# Patient Record
Sex: MALE | Race: WHITE | NOT HISPANIC OR LATINO | URBAN - METROPOLITAN AREA
[De-identification: names, ages, dates, MRNs, and addresses within clinical notes are randomized per-mention and may not be internally consistent; named-entity substitution may affect disease eponyms.]

---

## 2025-04-17 ENCOUNTER — OFFICE VISIT (OUTPATIENT)
Dept: SURGERY | Facility: CLINIC | Age: 70
End: 2025-04-17
Payer: MEDICARE

## 2025-04-17 DIAGNOSIS — K40.90 LEFT INGUINAL HERNIA: Primary | ICD-10-CM

## 2025-04-17 PROCEDURE — 1036F TOBACCO NON-USER: CPT | Performed by: SURGERY

## 2025-04-17 PROCEDURE — 99203 OFFICE O/P NEW LOW 30 MIN: CPT | Performed by: SURGERY

## 2025-04-17 PROCEDURE — 1160F RVW MEDS BY RX/DR IN RCRD: CPT | Performed by: SURGERY

## 2025-04-17 PROCEDURE — 1159F MED LIST DOCD IN RCRD: CPT | Performed by: SURGERY

## 2025-04-17 RX ORDER — MULTIVIT/IRON SULF/FOLIC ACID 15MG-0.4MG
1 TABLET ORAL
COMMUNITY
Start: 2024-06-02

## 2025-04-17 RX ORDER — LANOLIN ALCOHOL/MO/W.PET/CERES
1 CREAM (GRAM) TOPICAL
COMMUNITY
Start: 2024-08-13

## 2025-04-17 NOTE — PROGRESS NOTES
Subjective   Patient ID: Timi aNm is a 69 y.o. male who presents for Hernia (LIH, C/O BURNING PAIN).  Palpable reducible lump in the left inguinal area, the patient noticed above-mentioned changes few months ago.    HPI as described above.  Patient had a recent admission to the hospital with a diverticulitis with intramural abscess  Review of Systems GI consistent with reducible uncomfortable lump in the left inguinal area.  Review of all other 10 system is negative  Physical Exam Pupils equal bilaterally, oral mucosa moist, bilateral breath sounds, clear to auscultation, regular rhythm and rate, no murmurs, abdomen is soft, nontender, nondistended, palpable reducible left inguinal hernia.  No evidence of recurrence of the right inguinal hernia, palpable peripheral pulse, no focal neurological motor deficits.  ENT exam within normal limits.  Musculoskeletal exam within normal limits.      Objective I reviewed all available data including lab results, radiological studies, previous reports and notes.  CT scan of the abdomen pelvis official report showed no evidence of the left inguinal hernia.  I personally reviewed the result.  There is evidence of small hernia, consistent with the findings on the physical exam    No diagnosis found.   Problem List[1]   RX Allergies[2]   Medication Documentation Review Audit       Reviewed by Hector Landry MD (Physician) on 04/17/25 at 1542      Medication Order Taking? Sig Documenting Provider Last Dose Status   Tab-A-Dada Multivitamin w-iron 15 mg iron- 400 mcg tablet [id number] Yes Take 1 tablet by mouth early in the morning.. Historical Provider, MD  Active   thiamine 100 mg tablet [id number] Yes Take 1 tablet (100 mg) by mouth early in the morning.. Historical Provider, MD  Active   turmeric-ging-olive-oreg-capry 100 mg-150 mg- 50 mg-150 mg capsule [id number] Yes 2 caps, ORAL, DAILY, Date: 8/10/24 12:32:00 PM EDT, Capsule Historical Provider, MD  Active                     Medical History[3]  Tobacco Use History[4]  Family History[5]   Surgical History[6]    Assessment/Plan   The patient with symptomatic left inguinal hernia.  The patient has indication for open symptomatic left inguinal hernia repair.  Patient had a previous history of diverticulitis with intramural abscess.  Risks, benefits, alternative treatment were explained to the patient.  All questions were answered.  Informed consent was obtained.      Hector Landry MD          [1] There is no problem list on file for this patient.  [2] No Known Allergies  [3] History reviewed. No pertinent past medical history.  [4]   Social History  Tobacco Use   Smoking Status Former    Types: Cigarettes   Smokeless Tobacco Never   [5] No family history on file.  [6]   Past Surgical History:  Procedure Laterality Date    HERNIA REPAIR  11/11/2014    Inguinal Hernia Repair    KNEE SURGERY  09/22/2014    Knee Surgery    TONSILLECTOMY  09/22/2014    Tonsillectomy

## 2025-05-15 ENCOUNTER — APPOINTMENT (OUTPATIENT)
Dept: SURGERY | Facility: CLINIC | Age: 70
End: 2025-05-15
Payer: MEDICARE

## 2025-06-02 ENCOUNTER — APPOINTMENT (OUTPATIENT)
Dept: SURGERY | Facility: CLINIC | Age: 70
End: 2025-06-02
Payer: MEDICARE

## 2025-06-02 VITALS — SYSTOLIC BLOOD PRESSURE: 120 MMHG | DIASTOLIC BLOOD PRESSURE: 80 MMHG | WEIGHT: 151 LBS

## 2025-06-02 DIAGNOSIS — K40.90 LEFT INGUINAL HERNIA: Primary | ICD-10-CM

## 2025-06-02 DIAGNOSIS — K82.4 GALLBLADDER POLYP: ICD-10-CM

## 2025-06-02 PROCEDURE — 99024 POSTOP FOLLOW-UP VISIT: CPT | Performed by: SURGERY

## 2025-06-02 PROCEDURE — 1160F RVW MEDS BY RX/DR IN RCRD: CPT | Performed by: SURGERY

## 2025-06-02 PROCEDURE — 1036F TOBACCO NON-USER: CPT | Performed by: SURGERY

## 2025-06-02 PROCEDURE — 1159F MED LIST DOCD IN RCRD: CPT | Performed by: SURGERY

## 2025-06-02 RX ORDER — ACETAMINOPHEN 500 MG
1 TABLET ORAL DAILY
COMMUNITY

## 2025-06-02 RX ORDER — MAGNESIUM 200 MG
TABLET ORAL
COMMUNITY

## 2025-06-02 ASSESSMENT — PATIENT HEALTH QUESTIONNAIRE - PHQ9
SUM OF ALL RESPONSES TO PHQ9 QUESTIONS 1 AND 2: 0
1. LITTLE INTEREST OR PLEASURE IN DOING THINGS: NOT AT ALL
2. FEELING DOWN, DEPRESSED OR HOPELESS: NOT AT ALL

## 2025-06-02 NOTE — PROGRESS NOTES
Subjective   Patient ID: Timi Nam is a 69 y.o. male who presents for postoperative visit after robotic left inguinal hernia repair.  No complaints.    HPI  Review of Systems  Physical Exam  All incision healed by the primary intention  Objective     No diagnosis found.   Problem List[1]   RX Allergies[2]   Medication Documentation Review Audit       Reviewed by Hector Landry MD (Physician) on 25 at 1553      Medication Order Taking? Sig Documenting Provider Last Dose Status   calcium carbonate-vitamin D3 600 mg-20 mcg (800 unit) tablet [id number] Yes Take 1 tablet by mouth once daily. Historical Provider, MD  Active   magnesium 200 mg tablet [id number] Yes Take by mouth. Historical Provider, MD  Active   RED BEET ORAL [id number] Yes Take by mouth. Historical Provider, MD  Active   Tab-A-Dada Multivitamin w-iron 15 mg iron- 400 mcg tablet [id number]  Take 1 tablet by mouth early in the morning.. Historical Provider, MD  Active   thiamine 100 mg tablet [id number]  Take 1 tablet (100 mg) by mouth early in the morning.. Historical Provider, MD  Active   turmeric-ging-olive-oreg-capry 100 mg-150 mg- 50 mg-150 mg capsule [id number] Yes 2 caps, ORAL, DAILY, Date: 8/10/24 12:32:00 PM EDT, Capsule Historical Provider, MD  Active                    Medical History[3]  Tobacco Use History[4]  Family History[5]   Surgical History[6]    Assessment/Plan     No evidence of surgical complications, follow-up as needed    Hector Landry MD          [1] There is no problem list on file for this patient.  [2] No Known Allergies  [3]   Past Medical History:  Diagnosis Date    Colon polyp     Diverticulitis of colon    [4]   Social History  Tobacco Use   Smoking Status Former    Current packs/day: 0.00    Average packs/day: 1 pack/day for 38.0 years (38.0 ttl pk-yrs)    Types: Cigarettes    Quit date: 2011    Years since quittin.0   Smokeless Tobacco Never   Tobacco Comments    Quit many times throughout the years    [5]   Family History  Problem Relation Name Age of Onset    Cancer Mother Nini MEDINA Cyril     Anesthesia related problems Father Timi Nam     Arthritis Father Timi Nam    [6]   Past Surgical History:  Procedure Laterality Date    COLON SURGERY      HERNIA REPAIR  11/11/2014    Inguinal Hernia Repair    KNEE SURGERY  09/22/2014    Knee Surgery    TONSILLECTOMY  09/22/2014    Tonsillectomy